# Patient Record
Sex: MALE | Race: BLACK OR AFRICAN AMERICAN | NOT HISPANIC OR LATINO | URBAN - METROPOLITAN AREA
[De-identification: names, ages, dates, MRNs, and addresses within clinical notes are randomized per-mention and may not be internally consistent; named-entity substitution may affect disease eponyms.]

---
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
## 2018-03-07 ENCOUNTER — EMERGENCY (EMERGENCY)
Facility: HOSPITAL | Age: 18
LOS: 1 days | Discharge: HOME | End: 2018-03-07
Attending: EMERGENCY MEDICINE | Admitting: EMERGENCY MEDICINE
Payer: COMMERCIAL

## 2018-03-07 VITALS
DIASTOLIC BLOOD PRESSURE: 65 MMHG | OXYGEN SATURATION: 98 % | WEIGHT: 195.11 LBS | RESPIRATION RATE: 18 BRPM | HEIGHT: 67 IN | TEMPERATURE: 98 F | HEART RATE: 58 BPM | SYSTOLIC BLOOD PRESSURE: 113 MMHG

## 2018-03-07 VITALS
SYSTOLIC BLOOD PRESSURE: 121 MMHG | HEART RATE: 62 BPM | OXYGEN SATURATION: 99 % | DIASTOLIC BLOOD PRESSURE: 64 MMHG | TEMPERATURE: 98 F | RESPIRATION RATE: 16 BRPM

## 2018-03-07 DIAGNOSIS — Z90.89 ACQUIRED ABSENCE OF OTHER ORGANS: Chronic | ICD-10-CM

## 2018-03-07 PROCEDURE — 99283 EMERGENCY DEPT VISIT LOW MDM: CPT | Mod: 25

## 2018-03-07 PROCEDURE — 73030 X-RAY EXAM OF SHOULDER: CPT

## 2018-03-07 PROCEDURE — 99283 EMERGENCY DEPT VISIT LOW MDM: CPT

## 2018-03-07 PROCEDURE — 73030 X-RAY EXAM OF SHOULDER: CPT | Mod: 26,RT

## 2018-03-07 RX ORDER — IBUPROFEN 200 MG
600 TABLET ORAL ONCE
Qty: 0 | Refills: 0 | Status: COMPLETED | OUTPATIENT
Start: 2018-03-07 | End: 2018-03-07

## 2018-03-07 RX ORDER — ALBUTEROL 90 UG/1
1 AEROSOL, METERED ORAL
Qty: 0 | Refills: 0 | COMMUNITY

## 2018-03-07 RX ADMIN — Medication 600 MILLIGRAM(S): at 11:06

## 2018-03-07 NOTE — ED PROVIDER NOTE - OBJECTIVE STATEMENT
injured R shoulder on 3/4/18 while playing football. pt states it was pulled back while it was elevated.  since then has not been able to lift the arm past 90 degrees 2/2 to pain.

## 2018-03-07 NOTE — ED ADULT TRIAGE NOTE - CHIEF COMPLAINT QUOTE
I was playing football a couple of days ago and hyperextended my right shoulder and I'm still having pain.

## 2018-03-07 NOTE — ED ADULT NURSE NOTE - OBJECTIVE STATEMENT
Pt. is presenting to the ED with complaints of right shoulder pain. On 3/4/18 patient was playing football and another player pushed/hyperextended his arm. Patient can move his arm but states that he doesn't have full range of motion, when he gets his arm above his head it makes a "cracking" sound. Pain is at an 8 with activity, slightly less painful at rest. There is no swelling, bruising or deformity of the right shoulder, no previous injury to his right shoulder or arm. Denies head injury or pain in any other body part.

## 2018-07-30 NOTE — ED ADULT NURSE NOTE - BP NONINVASIVE SYSTOLIC (MM HG)
Muscle Hinge Flap Text: The defect edges were debeveled with a #15 scalpel blade.  Given the size, depth and location of the defect and the proximity to free margins a muscle hinge flap was deemed most appropriate.  Using a sterile surgical marker, an appropriate hinge flap was drawn incorporating the defect. The area thus outlined was incised with a #15 scalpel blade.  The skin margins were undermined to an appropriate distance in all directions utilizing iris scissors. 158

## 2020-02-17 NOTE — ED ADULT NURSE NOTE - TOBACCO USE
238 [facility]- patient assessed; see flowsheet, no changes noted 2000- in bed;daughtr at bedside, denies pain. , daughter at bedside.  
  
2200-turned and re[positioned 
  
0000-in bed resting quietly  
  
0200-in bed resting quietly no distress 
  
0400-staff present, 1 XL soft bm noted, pericare performed, turn and reposition, gown and linen changed, call light in reach  
  
0600- no overnight events, dsgs to BLE intact, remains on contact, call light  In reach Never smoker

## 2022-03-24 NOTE — ED ADULT NURSE NOTE - LEARNING READINESS
Pt medicated per orders. Updated that he should NOT go home and drink a couple beers as he indicated he was going to do with the medication given. Dr Elsy iSmeon at bedside stating the same.       Herb Bejarano RN  03/24/22 2375
Pt to rm 15 per intake c/o low back pain x couple days after lifting/moving stuff at home. Pt states he took 500mg tylenol PTA, pain 6/10. Pt appears in no acute distress, VSS.       Selina Carpenter RN  03/24/22 8654
Yes

## 2022-04-05 ENCOUNTER — TRANSCRIPTION ENCOUNTER (OUTPATIENT)
Age: 22
End: 2022-04-05

## 2022-04-12 NOTE — ED ADULT TRIAGE NOTE - AS O2 DELIVERY
Pt here with left sided abdominal pain and swelling, fever, and vomiting since the 6th.  Came here on Sunday but pain never went away.  
room air

## 2022-10-23 ENCOUNTER — EMERGENCY (EMERGENCY)
Facility: HOSPITAL | Age: 22
LOS: 1 days | Discharge: HOME | End: 2022-10-23
Attending: EMERGENCY MEDICINE | Admitting: EMERGENCY MEDICINE
Payer: COMMERCIAL

## 2022-10-23 VITALS
OXYGEN SATURATION: 98 % | DIASTOLIC BLOOD PRESSURE: 68 MMHG | TEMPERATURE: 98 F | RESPIRATION RATE: 18 BRPM | SYSTOLIC BLOOD PRESSURE: 112 MMHG | HEART RATE: 67 BPM

## 2022-10-23 VITALS
WEIGHT: 199.96 LBS | TEMPERATURE: 98 F | HEIGHT: 67 IN | HEART RATE: 74 BPM | OXYGEN SATURATION: 97 % | RESPIRATION RATE: 16 BRPM | DIASTOLIC BLOOD PRESSURE: 73 MMHG | SYSTOLIC BLOOD PRESSURE: 115 MMHG

## 2022-10-23 DIAGNOSIS — Z90.89 ACQUIRED ABSENCE OF OTHER ORGANS: Chronic | ICD-10-CM

## 2022-10-23 PROBLEM — J45.909 UNSPECIFIED ASTHMA, UNCOMPLICATED: Chronic | Status: ACTIVE | Noted: 2017-04-19

## 2022-10-23 PROCEDURE — 73562 X-RAY EXAM OF KNEE 3: CPT | Mod: 26,LT

## 2022-10-23 PROCEDURE — 73562 X-RAY EXAM OF KNEE 3: CPT

## 2022-10-23 PROCEDURE — 99283 EMERGENCY DEPT VISIT LOW MDM: CPT

## 2022-10-23 RX ORDER — IBUPROFEN 200 MG
600 TABLET ORAL ONCE
Refills: 0 | Status: COMPLETED | OUTPATIENT
Start: 2022-10-23 | End: 2022-10-23

## 2022-10-23 RX ADMIN — Medication 600 MILLIGRAM(S): at 12:07

## 2022-10-23 NOTE — ED PROVIDER NOTE - OBJECTIVE STATEMENT
Patient complaining of left knee pain status post injured last night when twisted and fell while playing kickball with friends.  Patient was able to run afterwards however it hurt while he was.  Patient denies head injury LOC dizziness nausea vomiting weakness numbness chest pain short of breath abdominal pain arm pain neck or back pain.  Patient reports history of injuring his knee in the past.  Orthopedist none

## 2022-10-23 NOTE — ED ADULT NURSE NOTE - OBJECTIVE STATEMENT
23 yo M pmh of asthma ambulated to ED c/o left knee 21 yo M pmh of asthma ambulated to ED c/o left knee pain/injury starting yesterday.  Pt states that he twisted the left knee while playing kickball, and since then has had diffuse knee pain with mild edema and difficulty bearing weight.  Pt states that knee feels like "it's giving out".  Pt endorses injuring knee 2 years prior, but required no f/u. Denies CP, back pain, SOB, fevers/chills, n/v/d, lightheadedness, dizziness, changes in urinary or bowel habits.  A&Ox4, gross neuro intact, +peripheral pulses, limited ROM in the LLE r/t pain.  +peripheral pulses noted, no discernable edema present.  +tenderness to palpation on the medial and lateral aspects of the knee.  Skin w/d/i.  Safety maintained.  Steady gait noted with ambulation.  Will continue to monitor.

## 2022-10-23 NOTE — ED PROVIDER NOTE - CARE PROVIDER_API CALL
Brandon Vu)  Orthopaedic Sports Medicine; Orthopaedic Surgery  [address]  [place] [address]  Phone: [phone]  Fax: [phone]  Follow Up Time: 1-3 Days

## 2022-10-23 NOTE — ED PROVIDER NOTE - PATIENT PORTAL LINK FT
You can access the FollowMyHealth Patient Portal offered by [facility] by registering at the following website: [email or web address]. By joining [employer]’s FollowMyHealth portal, you will also be able to view your health information using other applications (apps) compatible with our system.

## 2022-10-23 NOTE — ED PROVIDER NOTE - CLINICAL SUMMARY MEDICAL DECISION MAKING FREE TEXT BOX
Patient complaining of left knee pain status post injured last night when twisted and fell while playing kickball with friends.  Patient was able to run afterwards however it hurt while he was.  Patient denies head injury LOC dizziness nausea vomiting weakness numbness chest pain short of breath abdominal pain arm pain neck or back pain.  Patient reports history of injuring his knee in the past.  Plan Motrin x-ray knee immobilizer orthopedic follow-up

## 2022-10-23 NOTE — ED ADULT TRIAGE NOTE - CHIEF COMPLAINT QUOTE
" I twisted my left knee playing sports 2 months ago, but re injured it yesterday, I slipped and the I also stopped hard on my leg while running "

## 2022-10-25 ENCOUNTER — APPOINTMENT (OUTPATIENT)
Age: 22
End: 2022-10-25
Payer: COMMERCIAL

## 2022-10-25 VITALS
BODY MASS INDEX: 31.39 KG/M2 | SYSTOLIC BLOOD PRESSURE: 141 MMHG | HEIGHT: 67 IN | DIASTOLIC BLOOD PRESSURE: 90 MMHG | WEIGHT: 200 LBS

## 2022-10-25 DIAGNOSIS — J45.909 UNSPECIFIED ASTHMA, UNCOMPLICATED: ICD-10-CM

## 2022-10-25 DIAGNOSIS — S89.92XA UNSPECIFIED INJURY OF LEFT LOWER LEG, INITIAL ENCOUNTER: ICD-10-CM

## 2022-10-25 PROCEDURE — 99203 OFFICE O/P NEW LOW 30 MIN: CPT

## 2022-10-25 NOTE — HISTORY OF PRESENT ILLNESS
[de-identified] : Mr. Rahman is a 22-year-old male who presented to the office for evaluation of his left knee pain.  Patient experienced a left knee injury when playing football about 8 months ago.  Patient states that he pivoted in the ground and twisted his knee, with the knee twisting one way in his body another way.  He states that he heard or felt a cracking sound at the time of injury.  Patient experienced knee pain at the time, but it eventually improved and he did not seek treatment.  Occasionally over the last few months, the knee would lock up or he would have pain with certain motions.  Patient states that his knee has felt "loose" since the injury.  He reports occasional buckling episodes, usually when performing strenuous exercises such as squats. On 10/23/2022, patient was playing kickball and running on wet grass when he felt an exacerbation of the knee pain.  Pain caused him to fall to the ground the patient states that he may have twisted his knee.  He presented to the emergency department, where XRays were negative.  Patient was placed into a knee immobilizer and recommended to follow-up with orthopedics.  No fevers or chills.  No numbness or tingling.  No hip or groin pain.  No lower back pain.\par
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
## 2022-10-25 NOTE — PHYSICAL EXAM
[de-identified] : Constitutional:  22 year old male, alert and oriented, cooperative, in no acute distress.\par \par HEENT \par NC/AT.  Appearance: symmetric\par \par Neck/Back\par Straight without deformity or instability.  Good ROM.\par \par Chest/Respiratory \par Respiratory effort: no intercostal retractions or use of accessory muscles. Nonlabored Breathing\par \par Skin \par On inspection, warm and dry without rashes or lesions.\par \par Mental Status: \par Judgment, insight: intact\par Orientation: oriented to time, place, and person\par \par Neurological:\par Sensory and Motor are grossly intact throughout\par \par Left Knee\par \par Inspection:\par     Skin intact, no rashes or lesions\par     Moderate Effusion\par     Non-tender to palpation over tibial tubercle, patella, lateral joint line, and pes insertion.\par     Tenderness over the medial joint line\par \par Range of Motion:\par 	Extension - -5 degrees\par 	Flexion - 90 degrees\par 	Extensor lag: None\par \par Stability:\par      Slight medial and lateral gapping with varus/valgus stress\par      Mild Anterior drawer. No Posterior drawer.\par      Negative Lachman's\par      Negative McMurrays\par \par Patella: stable, tracks well. \par \par Gait: Antalgic in knee immobilizer\par \par Neurologic Exam\par     Motor intact including 5/5 Extensor Hallucis Longus, 5/5 Flexor Hallucis Longus, 5/5 Tibialis Anterior and 5/5 Gastrocnemius\par     Sensation Intact to Light Touch including Saphenous, Sural, Superficial Peroneal, Deep Peroneal, Tibial nerve distributions\par \par Vascular Exam\par     Foot is warm and well perfused with 2+ Dorsalis Pedis Pulse \par \par No pain with range of motion of the bilateral hips or right knee. No lumbar paraspinal muscle tenderness.  [de-identified] : XRay Left Knee:  10/23/22\par \par INTERPRETATION: Clinical history: 22-year-old male, injury.\par \par Three views of the left knee without comparison demonstrate no fracture dislocation, degenerative change or suprapatellar effusion.\par \par IMPRESSION:\par No acute radiographic findings, if there is continued clinical concern follow-up MRI can be ordered

## 2022-10-25 NOTE — REVIEW OF SYSTEMS
[Joint Pain] : joint pain [Joint Swelling] : joint swelling [Joint Stiffness] : no joint stiffness [Fever] : no fever [Chills] : no chills [FreeTextEntry5] : No chest pain [FreeTextEntry6] : No shortness of breath [de-identified] : No fevers or chills [de-identified] : No numbness or tingling [de-identified] : No diabetes

## 2022-10-28 ENCOUNTER — APPOINTMENT (OUTPATIENT)
Dept: MRI IMAGING | Facility: CLINIC | Age: 22
End: 2022-10-28

## 2022-11-07 ENCOUNTER — NON-APPOINTMENT (OUTPATIENT)
Age: 22
End: 2022-11-07

## 2022-11-07 DIAGNOSIS — S83.519A SPRAIN OF ANTERIOR CRUCIATE LIGAMENT OF UNSPECIFIED KNEE, INITIAL ENCOUNTER: ICD-10-CM

## 2022-11-08 ENCOUNTER — APPOINTMENT (OUTPATIENT)
Dept: ORTHOPEDIC SURGERY | Facility: CLINIC | Age: 22
End: 2022-11-08

## 2022-12-21 NOTE — ED ADULT TRIAGE NOTE - ESI TRIAGE ACUITY LEVEL, MLM
Rx Refill Note  Requested Prescriptions     Pending Prescriptions Disp Refills   • DULoxetine (CYMBALTA) 60 MG capsule [Pharmacy Med Name: DULOXETINE HCL 60 MG CPEP 60 Capsule] 90 capsule 1     Sig: TAKE ONE CAPSULE BY MOUTH DAILY      Last office visit with prescribing clinician: 6/7/2022   Last telemedicine visit with prescribing clinician: 12/29/2022   Next office visit with prescribing clinician: 12/29/2022       Deirdre Sanchez MA  12/21/22, 13:48 CST     4

## 2023-05-01 NOTE — ED PROVIDER NOTE - DISCHARGE DATE
19-Apr-2017 Azathioprine Counseling:  I discussed with the patient the risks of azathioprine including but not limited to myelosuppression, immunosuppression, hepatotoxicity, lymphoma, and infections.  The patient understands that monitoring is required including baseline LFTs, Creatinine, possible TPMP genotyping and weekly CBCs for the first month and then every 2 weeks thereafter.  The patient verbalized understanding of the proper use and possible adverse effects of azathioprine.  All of the patient's questions and concerns were addressed.

## 2023-06-06 NOTE — ED ADULT NURSE NOTE - CAS EDN DISCHARGE ASSESSMENT
Rash today starting on face, back, and stomach. Parents stopped given amox since they thought it was contributing to the rash. Last dose was 0800 this morning. No other medications given. No vomiting, no fevers, good PO and UOP   Alert and oriented to person, place and time

## 2023-08-17 NOTE — ED ADULT NURSE NOTE - NSSEPSISSUSPECTED_ED_A_ED
No Muscle Hinge Flap Text: The defect edges were debeveled with a #15 scalpel blade.  Given the size, depth and location of the defect and the proximity to free margins a muscle hinge flap was deemed most appropriate.  Using a sterile surgical marker, an appropriate hinge flap was drawn incorporating the defect. The area thus outlined was incised with a #15 scalpel blade.  The skin margins were undermined to an appropriate distance in all directions utilizing iris scissors. The flap was then moved into the primary defect.

## 2024-04-24 ENCOUNTER — NON-APPOINTMENT (OUTPATIENT)
Age: 24
End: 2024-04-24